# Patient Record
Sex: MALE | Race: BLACK OR AFRICAN AMERICAN | NOT HISPANIC OR LATINO | Employment: OTHER | URBAN - METROPOLITAN AREA
[De-identification: names, ages, dates, MRNs, and addresses within clinical notes are randomized per-mention and may not be internally consistent; named-entity substitution may affect disease eponyms.]

---

## 2024-05-15 ENCOUNTER — TELEPHONE (OUTPATIENT)
Dept: UROLOGY | Facility: CLINIC | Age: 74
End: 2024-05-15
Payer: MEDICARE

## 2024-05-23 ENCOUNTER — PROCEDURE VISIT (OUTPATIENT)
Dept: NEUROLOGY | Facility: CLINIC | Age: 74
End: 2024-05-23
Payer: MEDICARE

## 2024-05-23 DIAGNOSIS — G89.29 CHRONIC LEFT SHOULDER PAIN: Primary | ICD-10-CM

## 2024-05-23 DIAGNOSIS — M25.512 CHRONIC LEFT SHOULDER PAIN: Primary | ICD-10-CM

## 2024-05-23 PROCEDURE — 97814 ACUP 1/> W/ESTIM EA ADDL 15: CPT | Mod: PBBFAC | Performed by: PSYCHIATRY & NEUROLOGY

## 2024-05-23 PROCEDURE — 97814 ACUP 1/> W/ESTIM EA ADDL 15: CPT | Mod: S$PBB,,, | Performed by: PSYCHIATRY & NEUROLOGY

## 2024-05-23 PROCEDURE — 97813 ACUP 1/> W/ESTIM 1ST 15 MIN: CPT | Mod: S$PBB,,, | Performed by: PSYCHIATRY & NEUROLOGY

## 2024-05-23 PROCEDURE — 97813 ACUP 1/> W/ESTIM 1ST 15 MIN: CPT | Mod: PBBFAC | Performed by: PSYCHIATRY & NEUROLOGY

## 2024-05-23 RX ORDER — REPAGLINIDE 2 MG/1
4 TABLET ORAL 3 TIMES DAILY
COMMUNITY
Start: 2024-05-08

## 2024-05-23 RX ORDER — INSULIN DEGLUDEC 100 U/ML
INJECTION, SOLUTION SUBCUTANEOUS
COMMUNITY
Start: 2024-05-16

## 2024-05-23 RX ORDER — METFORMIN HYDROCHLORIDE 1000 MG/1
1000 TABLET ORAL 2 TIMES DAILY
COMMUNITY
Start: 2024-04-17

## 2024-05-23 RX ORDER — LOSARTAN POTASSIUM 100 MG/1
100 TABLET ORAL
COMMUNITY
Start: 2024-04-17

## 2024-05-23 NOTE — PROCEDURES
Here for acp    C/o L shoulder pain, L forearm pain, bilateral hand pain    30 min tx  L shoulder 4 needle 30 hz  Bilateral MH 6/8; 30 hz  KB 1 L   2 needle L forearm

## 2024-05-29 ENCOUNTER — TELEPHONE (OUTPATIENT)
Dept: UROLOGY | Facility: CLINIC | Age: 74
End: 2024-05-29
Payer: MEDICARE

## 2024-05-29 DIAGNOSIS — R97.20 PSA ELEVATION: Primary | ICD-10-CM

## 2024-06-07 ENCOUNTER — OFFICE VISIT (OUTPATIENT)
Dept: UROLOGY | Facility: CLINIC | Age: 74
End: 2024-06-07
Payer: MEDICARE

## 2024-06-07 VITALS
DIASTOLIC BLOOD PRESSURE: 70 MMHG | HEART RATE: 73 BPM | SYSTOLIC BLOOD PRESSURE: 110 MMHG | HEIGHT: 68 IN | OXYGEN SATURATION: 96 % | BODY MASS INDEX: 25.16 KG/M2 | WEIGHT: 166 LBS

## 2024-06-07 DIAGNOSIS — R97.20 PSA ELEVATION: ICD-10-CM

## 2024-06-07 DIAGNOSIS — N52.9 ERECTILE DYSFUNCTION, UNSPECIFIED ERECTILE DYSFUNCTION TYPE: Primary | ICD-10-CM

## 2024-06-07 LAB
PROSTATE SPECIFIC ANTIGEN, TOTAL: 4.29 NG/ML (ref 0.1–4)
PSA FREE MFR SERPL: 19.1 %
PSA FREE SERPL-MCNC: 0.82 NG/ML

## 2024-06-07 PROCEDURE — 99204 OFFICE O/P NEW MOD 45 MIN: CPT | Mod: S$GLB,,, | Performed by: NURSE PRACTITIONER

## 2024-06-07 RX ORDER — SILDENAFIL 100 MG/1
100 TABLET, FILM COATED ORAL DAILY PRN
Qty: 10 TABLET | Refills: 11 | Status: SHIPPED | OUTPATIENT
Start: 2024-06-07 | End: 2025-06-07

## 2024-06-07 NOTE — PROGRESS NOTES
Subjective:       Patient ID: George Cheng is a 74 y.o. male.    Chief Complaint: Elevated PSA      HPI: 74-year-old male, new to [facility], referred by his primary care provider Scarlett Chavarria.    Patient referred for elevated PSA.  On 2024 patient a PSA of 4.6 with a% free of 26.  Back in 2023 his PSA was 2.9 with a% free of 24.    Patient denies any pain or burning urination.  Denies any difficulty voiding.  Denies any significant frequency, urgency, or nocturia.  Denies any odor to the urine.  Denies any fever or body aches.  Denies any blood in urine.  Denies any unexpected weight loss.  Denies any new onset bone pain.      Patient states he has a history of erectile dysfunction.    He has been on sildenafil 100 mg in the past with success.  Patient would like to get back on sildenafil.      No other urinary complaints at this time.         Past Medical History:   Past Medical History:   Diagnosis Date    DM (diabetes mellitus)     HTN (hypertension)        Past Surgical Historical:   Past Surgical History:   Procedure Laterality Date    GANGLION CYST EXCISION      HERNIA REPAIR      NECK SURGERY          Medications:   Medication List with Changes/Refills   New Medications    SILDENAFIL (VIAGRA) 100 MG TABLET    Take 1 tablet (100 mg total) by mouth daily as needed for Erectile Dysfunction.   Current Medications    LOSARTAN (COZAAR) 100 MG TABLET    Take 100 mg by mouth.    METFORMIN (GLUCOPHAGE) 1000 MG TABLET    Take 1,000 mg by mouth 2 (two) times daily.    REPAGLINIDE (PRANDIN) 2 MG TABLET    Take 4 mg by mouth 3 (three) times daily.    TRESIBA FLEXTOUCH U-100 100 UNIT/ML (3 ML) INSULIN PEN    SMARTSI Unit(s) SUB-Q Every Night        Past Social History:   Social History     Socioeconomic History    Marital status:    Tobacco Use    Smoking status: Never     Passive exposure: Never    Smokeless tobacco: Never   Substance and Sexual Activity    Alcohol use: Never    Drug use:  Never    Sexual activity: Yes       Allergies: Review of patient's allergies indicates:  No Known Allergies     Family History:   Family History   Problem Relation Name Age of Onset    Heart disease Mother          Review of Systems:  Review of Systems   Constitutional:  Negative for activity change and appetite change.   HENT:  Negative for congestion and dental problem.    Eyes:  Negative for visual disturbance.   Respiratory:  Negative for chest tightness and shortness of breath.    Cardiovascular:  Negative for chest pain.   Gastrointestinal:  Negative for abdominal distention and abdominal pain.   Genitourinary:  Negative for decreased urine volume, difficulty urinating, dysuria, enuresis, flank pain, frequency, genital sores, hematuria, penile discharge, penile pain, penile swelling, scrotal swelling, testicular pain and urgency.   Musculoskeletal:  Negative for back pain and neck pain.   Skin:  Negative for color change.   Neurological:  Negative for dizziness.   Hematological:  Negative for adenopathy.   Psychiatric/Behavioral:  Negative for agitation, behavioral problems and confusion.        Physical Exam:  Physical Exam  Vitals and nursing note reviewed.   Constitutional:       Appearance: He is well-developed.   HENT:      Head: Normocephalic.   Eyes:      Pupils: Pupils are equal, round, and reactive to light.   Cardiovascular:      Rate and Rhythm: Normal rate and regular rhythm.      Heart sounds: Normal heart sounds.   Pulmonary:      Effort: Pulmonary effort is normal.      Breath sounds: Normal breath sounds.   Abdominal:      General: Bowel sounds are normal.      Palpations: Abdomen is soft.   Genitourinary:     Comments: Declined YVONNE  Musculoskeletal:         General: Normal range of motion.      Cervical back: Normal range of motion and neck supple.   Skin:     General: Skin is warm and dry.   Neurological:      Mental Status: He is alert and oriented to person, place, and time.   Psychiatric:          Behavior: Behavior normal.         Assessment/Plan:   1. Elevated PSA:  Will check the patient's PSA and% free.  We will notify him of the results.    We discussed the concern of an elevated PSA.  Did discuss possible causes of elevated PSA.    Patient denies any urinary complaints.    Discussed MRI if PSA is still elevated.  Patient stated understanding and is agreeable.    2. Erectile dysfunction:  Patient has a history of erectile dysfunction.  He has been on sildenafil 100 mg in the past with success.    Will restart patient on sildenafil 100 mg.    Follow-up to arrange pending PSA.  Problem List Items Addressed This Visit    None  Visit Diagnoses       Erectile dysfunction, unspecified erectile dysfunction type    -  Primary    Relevant Medications    sildenafiL (VIAGRA) 100 MG tablet    PSA elevation        Relevant Orders    PSA, Total and Free

## 2024-06-10 ENCOUNTER — TELEPHONE (OUTPATIENT)
Dept: UROLOGY | Facility: CLINIC | Age: 74
End: 2024-06-10
Payer: MEDICARE

## 2024-06-10 DIAGNOSIS — R97.20 PSA ELEVATION: Primary | ICD-10-CM

## 2024-06-10 NOTE — TELEPHONE ENCOUNTER
Attempted to contact pt regarding psa results.    ----- Message from Jeevan Thomas NP sent at 6/10/2024  8:20 AM CDT -----  PSA has slightly improved but is still elevated.  PSA was 4.6 on 5/11/24 and is now 4.29.  Recommend MRI

## 2024-06-10 NOTE — TELEPHONE ENCOUNTER
Spoke with pt and informed of psa results. Pt would like to proceed with MRI of prostate. Order placed and sent to Jeevan Thomas NP.    ----- Message from Jeevan Thomas NP sent at 6/10/2024  8:20 AM CDT -----  PSA has slightly improved but is still elevated.  PSA was 4.6 on 5/11/24 and is now 4.29.  Recommend MRI

## 2024-06-27 ENCOUNTER — TELEPHONE (OUTPATIENT)
Dept: UROLOGY | Facility: CLINIC | Age: 74
End: 2024-06-27
Payer: MEDICARE

## 2024-06-27 NOTE — TELEPHONE ENCOUNTER
Spoke with pt and wife and informed of results. Pt scheduled for 3-4 months out.    ----- Message from Jeevan Thomas NP sent at 6/27/2024 10:35 AM CDT -----  MRI shows large prostate with advanced changes of BPH and history of prostatitis.    No suspicious lesions or tumors noted.    PI-RADS 2, cancer unlikely to be present.    Follow-up 3-4 months for re-evaluation, sooner if needed.